# Patient Record
Sex: MALE | Race: BLACK OR AFRICAN AMERICAN | NOT HISPANIC OR LATINO | URBAN - METROPOLITAN AREA
[De-identification: names, ages, dates, MRNs, and addresses within clinical notes are randomized per-mention and may not be internally consistent; named-entity substitution may affect disease eponyms.]

---

## 2024-01-16 ENCOUNTER — OFFICE VISIT (OUTPATIENT)
Dept: URBAN - METROPOLITAN AREA CLINIC 25 | Facility: CLINIC | Age: 54
End: 2024-01-16
Payer: MEDICARE

## 2024-01-16 ENCOUNTER — DASHBOARD ENCOUNTERS (OUTPATIENT)
Age: 54
End: 2024-01-16

## 2024-01-16 VITALS
HEIGHT: 70 IN | SYSTOLIC BLOOD PRESSURE: 192 MMHG | TEMPERATURE: 98.2 F | BODY MASS INDEX: 35.22 KG/M2 | DIASTOLIC BLOOD PRESSURE: 98 MMHG | HEART RATE: 86 BPM | WEIGHT: 246 LBS

## 2024-01-16 DIAGNOSIS — Z94.0 KIDNEY TRANSPLANT RECIPIENT: ICD-10-CM

## 2024-01-16 DIAGNOSIS — R19.7 DIARRHEA: ICD-10-CM

## 2024-01-16 DIAGNOSIS — R19.4 CHANGE IN BOWEL HABIT: ICD-10-CM

## 2024-01-16 PROBLEM — 161665007: Status: ACTIVE | Noted: 2024-01-16

## 2024-01-16 PROCEDURE — 99204 OFFICE O/P NEW MOD 45 MIN: CPT | Performed by: INTERNAL MEDICINE

## 2024-01-16 RX ORDER — LABETALOL HYDROCHLORIDE 300 MG/1
1 TABLET TABLET, FILM COATED ORAL TWICE A DAY
Status: ACTIVE | COMMUNITY

## 2024-01-16 RX ORDER — CLONIDINE 0.2 MG/D
1 PATCH TO SKIN PATCH TRANSDERMAL
Status: ACTIVE | COMMUNITY

## 2024-01-16 RX ORDER — POLYETHYLENE GLYCOL 3350, SODIUM CHLORIDE, SODIUM BICARBONATE, POTASSIUM CHLORIDE 420; 11.2; 5.72; 1.48 G/4L; G/4L; G/4L; G/4L
AS DIRECTED POWDER, FOR SOLUTION ORAL ONCE
Qty: 1 BOTTLE | Refills: 0 | OUTPATIENT
Start: 2024-01-16 | End: 2024-01-17

## 2024-01-16 RX ORDER — CHOLESTYRAMINE POWDER FOR SUSPENSION 4 G/8.78G
1 PACKET MIXED WITH WATER OR NON-CARBONATED DRINK POWDER, FOR SUSPENSION ORAL
Qty: 90 | Refills: 5 | OUTPATIENT
Start: 2024-01-16

## 2024-01-16 RX ORDER — SODIUM BICARBONATE TAB 650 MG 650 MG
AS DIRECTED TAB ORAL
Status: ACTIVE | COMMUNITY

## 2024-01-16 RX ORDER — TAMSULOSIN HYDROCHLORIDE 0.4 MG/1
1 CAPSULE CAPSULE ORAL ONCE A DAY
Status: ACTIVE | COMMUNITY

## 2024-01-16 RX ORDER — HYDRALAZINE HYDROCHLORIDE 100 MG/1
1 TABLET WITH FOOD TABLET, FILM COATED ORAL TWICE A DAY
Status: ACTIVE | COMMUNITY

## 2024-01-16 NOTE — HPI-TODAY'S VISIT:
53 year old man presenting with diarrhea.  He had OKT in 8/2022.  He is followed at [facility].  He has been having diarrhea for about a year.  He had his Prograf dose reduced and Cellcept was stopped as well.  He usual;ly has the diarrhea at night.  He has 3-4 loose/watery BM's usually at night.  He has urgency and he has had some FI while he was sleeping.  He denies LGI Bleed or melena.  He denies abdominal pain.  He denies anorexia or weight loss.  Per the pateint, stool testing was normal.  He denies UGI symptoms.  He did not respond to OTC antidiarrheals.  His DM is well controlled.  His last Hgb A1c was 6.1%.  He has not had a colonoscopy since the diarrhea started.  He is on Ozempic but the diarrhea started prior to this.  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

## 2024-01-31 ENCOUNTER — OFFICE VISIT (OUTPATIENT)
Dept: URBAN - METROPOLITAN AREA SURGERY CENTER 20 | Facility: SURGERY CENTER | Age: 54
End: 2024-01-31